# Patient Record
(demographics unavailable — no encounter records)

---

## 2025-03-20 NOTE — ADDENDUM
[FreeTextEntry1] : I, Ani Bassett NP, acted as scribe for Dr. Stacey Vizcarra for this patient encounter

## 2025-03-20 NOTE — ASSESSMENT
[FreeTextEntry1] : 45 year old female of average risk for colorectal neoplasm presenting for colon cancer screening. She will be scheduled for a colonoscopy with Miralax prep for further evaluation. I have discussed the indications (including but not limited to ruling out inflammatory bowel disease, colorectal neoplasm, GI bleed, and AVM's), benefits, risks  (including but not limited to reaction to the anesthesia, infection, bleeding, missed lesions, and perforation),  and alternatives to colonoscopy with the patient. The patient understands all options and has agreed to have a colonoscopy and is medically optimized for the planned procedure.   I, Dr. Stacey Vizcarra was present during history and visit Patient is here for routine screening colonoscopy.  Currently not having any GI symptoms.  No personal family history of colon cancer colon polyps I agree with the above assessment and plan Abdominal exam-positive bowel sound soft nontender

## 2025-03-20 NOTE — HISTORY OF PRESENT ILLNESS
[FreeTextEntry1] : GARCIA LEVI is a 45 year old female with PM of HLD (diet controlled), presenting today for colon cancer screening. She previously had a colonoscopy about 10 years ago with Dr. Estrada due to stomach pain she was experiencing at the time. She reports that it was normal. No family history of colon cancer or polyps. She feels well and offers no complaints. She denies any abdominal pain, bowel changes, rectal bleeding. He moves his bowels regularly. No upper GI complaints. Appetite is good and weight is stable.  Recent labs from January were unremarkable

## 2025-03-26 NOTE — PHYSICAL EXAM
[Alert] : alert [Normal Voice/Communication] : normal voice/communication [Healthy Appearing] : healthy appearing [No Respiratory Distress] : no respiratory distress [No Acc Muscle Use] : no accessory muscle use [Respiration, Rhythm And Depth] : normal respiratory rhythm and effort [Auscultation Breath Sounds / Voice Sounds] : lungs were clear to auscultation bilaterally [Bowel Sounds] : normal bowel sounds [Abdomen Tenderness] : non-tender [No Masses] : no abdominal mass palpated [Abdomen Soft] : soft [Oriented To Time, Place, And Person] : oriented to person, place, and time

## 2025-05-20 NOTE — PLAN
[FreeTextEntry1] : pelvic sonogram revealed 5 .89 cm left simple cyst   discussed risk of torsion  repeat sono in 6 wks with visit

## 2025-05-20 NOTE — DISCUSSION/SUMMARY
[FreeTextEntry1] : discussed with pt after exam likely pain due to gas and bloating  pt also noted constipation  recommended mylicon

## 2025-05-20 NOTE — HISTORY OF PRESENT ILLNESS
[Y] : Positive pregnancy history [FreeTextEntry1] : 44 y/o presented for annual exam  c/o menopausal symptoms  not been sexually active for 1 year  pelvic pain /pressure and pain with pants along abdomen on and off for the past 3-4 months  no abnormal bleeding last period in 2022 was treated for UTI 1 wk ago - finished antibiotics  no pain with urination  discussed with pt hormone replacement pt agrees to start - will start her on birth control   [Mammogramdate] : 4/2/25 [BoneDensityDate] : 1/7/25 [ColonoscopyDate] : 3/26/25 [LMPDate] : 7/2022 [MensesFreq] : 28 [MensesLength] : 6-7 [PGHxTotal] : 3 [[facility]] : 1 [PGHxAbortions] : 2 [[facility] Centeriving] : 1

## 2025-05-20 NOTE — PHYSICAL EXAM
[Soft] : soft [Non-tender] : non-tender [Non-distended] : non-distended [No HSM] : No HSM [No Lesions] : no lesions [No Mass] : no mass [Examination Of The Breasts] : a normal appearance [Breast Palpation Diffuse Fibrous Tissue Bilateral] : fibrocystic changes [No Masses] : no breast masses were palpable [Labia Majora] : normal [Labia Minora] : normal [Normal] : normal [Anteversion] : anteverted [Uterine Adnexae] : non-palpable [Tenderness] : nontender [Enlarged ___ wks] : not enlarged [Mass ___ cm] : no uterine mass was palpated

## 2025-07-01 NOTE — HISTORY OF PRESENT ILLNESS
[N] : Patient is not sexually active [Y] : Positive pregnancy history [Previously active] : previously active [Ultrasound] : ultrasound [FreeTextEntry1] : pt is here as a follow up  s/p repeat sono for simple cyst that was 5.9 cm now down to 4.6 cm  no pelvic pain  on ocp got her period  fsh elevated by blood in menopause   [Mammogramdate] : 4/2/25 [[facility] [BoneDensityDate] : 1/7/25 [HPVDate] : 5/20/25-WNL [MensesFreq] : 28 [MensesLength] : 6-7 [PGHxTotal] : 3 [[facility]] : 1 [[facility] Centeriving] : 1 [PGHxABInduced] : 2 [TextBox_27] : 7/1/25

## 2025-07-28 NOTE — HISTORY OF PRESENT ILLNESS
[de-identified] : 46 y/o [place]er speaking F w/ PMH of HLD, postmenopausal presents to establish care. Previous PCP was too far away. Only complaint is L distal finger joint pain that started two months ago, was worse in morning and got better with use. Now only hurts if she accidentally bumps it into something.

## 2025-07-28 NOTE — HEALTH RISK ASSESSMENT
[Very Good] : ~his/her~  mood as very good [No] : No [1 or 2 (0 pts)] : 1 or 2 (0 points) [Never (0 pts)] : Never (0 points) [No falls in past year] : Patient reported no falls in the past year [Little interest or pleasure doing things] : 1) Little interest or pleasure doing things [Feeling down, depressed, or hopeless] : 2) Feeling down, depressed, or hopeless [0] : 2) Feeling down, depressed, or hopeless: Not at all (0) [PHQ-2 Negative - No further assessment needed] : PHQ-2 Negative - No further assessment needed [Never] : Never [Patient reported mammogram was normal] : Patient reported mammogram was normal [Patient reported PAP Smear was normal] : Patient reported PAP Smear was normal [Patient reported colonoscopy was normal] : Patient reported colonoscopy was normal [HIV Test offered] : HIV Test offered [Hepatitis C test offered] : Hepatitis C test offered [With Family] : lives with family [# of Members in Household ___] :  household currently consist of [unfilled] member(s) [Employed] : employed [] :  [Significant Other] : lives with significant other [Feels Safe at Home] : Feels safe at home [Fully functional (bathing, dressing, toileting, transferring, walking, feeding)] : Fully functional (bathing, dressing, toileting, transferring, walking, feeding) [Fully functional (using the telephone, shopping, preparing meals, housekeeping, doing laundry, using] : Fully functional and needs no help or supervision to perform IADLs (using the telephone, shopping, preparing meals, housekeeping, doing laundry, using transportation, managing medications and managing finances) [Reports normal functional visual acuity (ie: able to read med bottle)] : Reports normal functional visual acuity [Smoke Detector] : smoke detector [Carbon Monoxide Detector] : carbon monoxide detector [Safety elements used in home] : safety elements used in home [Seat Belt] :  uses seat belt [[id number]] : 0 [Change in mental status noted] : No change in mental status noted [Language] : denies difficulty with language [Behavior] : denies difficulty with behavior [Learning/Retaining New Information] : denies difficulty learning/retaining new information [Handling Complex Tasks] : denies difficulty handling complex tasks [Reasoning] : denies difficulty with reasoning [Spatial Ability and Orientation] : denies difficulty with spatial ability and orientation [Reports changes in hearing] : Reports no changes in hearing [Reports changes in dental health] : Reports no changes in dental health [TB Exposure] : is not being exposed to tuberculosis [MammogramDate] : 04/2025 [PapSmearDate] : 05/2025 [ColonoscopyDate] : 03/2025 [FreeTextEntry2] : cleaning

## 2025-07-28 NOTE — HISTORY OF PRESENT ILLNESS
[de-identified] : 46 y/o [place]ian speaking F w/ PMH of HLD, postmenopausal presents to establish care. Previous PCP was too far away. Only complaint is L distal finger joint pain that started two months ago, was worse in morning and got better with use. Now only hurts if she accidentally bumps it into something.

## 2025-07-28 NOTE — HEALTH RISK ASSESSMENT
[Very Good] : ~his/her~  mood as very good [No] : No [1 or 2 (0 pts)] : 1 or 2 (0 points) [Never (0 pts)] : Never (0 points) [No falls in past year] : Patient reported no falls in the past year [Little interest or pleasure doing things] : 1) Little interest or pleasure doing things [Feeling down, depressed, or hopeless] : 2) Feeling down, depressed, or hopeless [0] : 2) Feeling down, depressed, or hopeless: Not at all (0) [PHQ-2 Negative - No further assessment needed] : PHQ-2 Negative - No further assessment needed [Never] : Never [Patient reported mammogram was normal] : Patient reported mammogram was normal [Patient reported PAP Smear was normal] : Patient reported PAP Smear was normal [Patient reported colonoscopy was normal] : Patient reported colonoscopy was normal [HIV Test offered] : HIV Test offered [Hepatitis C test offered] : Hepatitis C test offered [With Family] : lives with family [# of Members in Household ___] :  household currently consist of [unfilled] member(s) [Employed] : employed [] :  [Significant Other] : lives with significant other [Feels Safe at Home] : Feels safe at home [Fully functional (bathing, dressing, toileting, transferring, walking, feeding)] : Fully functional (bathing, dressing, toileting, transferring, walking, feeding) [Fully functional (using the telephone, shopping, preparing meals, housekeeping, doing laundry, using] : Fully functional and needs no help or supervision to perform IADLs (using the telephone, shopping, preparing meals, housekeeping, doing laundry, using transportation, managing medications and managing finances) [Reports normal functional visual acuity (ie: able to read med bottle)] : Reports normal functional visual acuity [Smoke Detector] : smoke detector [Carbon Monoxide Detector] : carbon monoxide detector [Safety elements used in home] : safety elements used in home [Seat Belt] :  uses seat belt [[id number]] : 0 [Change in mental status noted] : No change in mental status noted [Language] : denies difficulty with language [Behavior] : denies difficulty with behavior [Learning/Retaining New Information] : denies difficulty learning/retaining new information [Handling Complex Tasks] : denies difficulty handling complex tasks [Reasoning] : denies difficulty with reasoning [Spatial Ability and Orientation] : denies difficulty with spatial ability and orientation [Reports changes in hearing] : Reports no changes in hearing [Reports changes in dental health] : Reports no changes in dental health [TB Exposure] : is not being exposed to tuberculosis [MammogramDate] : 04/2025 [PapSmearDate] : 05/2025 [ColonoscopyDate] : 03/2025 [FreeTextEntry2] : cleaning

## 2025-07-28 NOTE — COUNSELING
[Behavioral health counseling provided] : Behavioral health counseling provided [Sleep ___ hours/day] : Sleep [unfilled] hours/day [Engage in a relaxing activity] : Engage in a relaxing activity [Good understanding] : Patient has a good understanding of lifestyle changes and steps needed to achieve self management goal

## 2025-07-28 NOTE — ASSESSMENT
[Vaccines Reviewed] : Immunizations reviewed today. Please see immunization details in the vaccine log within the immunization flowsheet.  [FreeTextEntry1] : #arthritis symptoms likely related to OA advised to take OTC NSAIDs PRN with food if needed  #postmenopausal recently started on birth control pills patient states she does not want to continue taking them advised patient to finish current pack and then discontinue given new GYN referral as her previous one relocated  #Health Maintenance Last colonoscopy performed 03/2025, unremarkable Routine EKG performed 01/2025, NSR Last pap smear performed 2025 Last mammogram performed 04/2025, unremarkable Routine labs sent including CBC, CMP, A1C, Lipid Profile, TSH w/ reflex, HIV, Hep C, UA vaccinations reviewed, up to date

## 2025-07-28 NOTE — DATA REVIEWED
[FreeTextEntry1] : reviewed previous labs (1/2025), colonoscopy, mammogram, dexa, and previous notes.